# Patient Record
Sex: MALE | Race: BLACK OR AFRICAN AMERICAN | Employment: OTHER | URBAN - METROPOLITAN AREA
[De-identification: names, ages, dates, MRNs, and addresses within clinical notes are randomized per-mention and may not be internally consistent; named-entity substitution may affect disease eponyms.]

---

## 2019-01-03 ENCOUNTER — HOSPITAL ENCOUNTER (OUTPATIENT)
Dept: LAB | Age: 64
Discharge: HOME | End: 2019-01-03
Payer: OTHER GOVERNMENT

## 2019-01-03 LAB — WBC #/AREA STL HPF: NORMAL /HPF (ref 0–4)

## 2019-01-03 PROCEDURE — 87493 C DIFF AMPLIFIED PROBE: CPT

## 2019-01-03 PROCEDURE — 89055 LEUKOCYTE ASSESSMENT FECAL: CPT

## 2019-01-03 PROCEDURE — 87177 OVA AND PARASITES SMEARS: CPT

## 2019-01-03 PROCEDURE — 87075 CULTR BACTERIA EXCEPT BLOOD: CPT

## 2019-01-04 LAB
Lab: NORMAL
SPECIMEN SOURCE: NORMAL

## 2019-01-08 LAB
O+P SPEC MICRO: NORMAL
O+P STL CONC: NORMAL
SPECIMEN SOURCE: NORMAL

## 2019-01-09 LAB
FAX TO INFO,FAXT: NORMAL
FAX TO NUMBER,FAXN: NORMAL
Lab: NORMAL
REFERENCE LAB,REFLB: NORMAL
TEST DESCRIPTION:,ATST: NORMAL

## 2022-04-05 ENCOUNTER — HOSPITAL ENCOUNTER (OUTPATIENT)
Dept: INFUSION THERAPY | Age: 67
Discharge: HOME | End: 2022-04-05
Payer: MEDICARE

## 2022-04-05 VITALS
DIASTOLIC BLOOD PRESSURE: 74 MMHG | RESPIRATION RATE: 16 BRPM | OXYGEN SATURATION: 98 % | SYSTOLIC BLOOD PRESSURE: 123 MMHG | HEART RATE: 78 BPM | TEMPERATURE: 98.6 F

## 2022-04-05 LAB
BASO+EOS+MONOS # BLD AUTO: 0.3 K/UL (ref 0–2.3)
BASO+EOS+MONOS NFR BLD AUTO: 4 % (ref 0.1–17)
DIFFERENTIAL METHOD BLD: ABNORMAL
ERYTHROCYTE [DISTWIDTH] IN BLOOD BY AUTOMATED COUNT: 14.4 % (ref 11.5–14.5)
HCT VFR BLD AUTO: 50.4 % (ref 36–48)
HGB BLD-MCNC: 16.5 G/DL (ref 12–16)
LYMPHOCYTES # BLD: 2.5 K/UL (ref 1.1–5.9)
LYMPHOCYTES NFR BLD: 33 % (ref 14–44)
MCH RBC QN AUTO: 30 PG (ref 25–35)
MCHC RBC AUTO-ENTMCNC: 32.7 G/DL (ref 31–37)
MCV RBC AUTO: 91.6 FL (ref 78–102)
NEUTS SEG # BLD: 4.7 K/UL (ref 1.8–9.5)
NEUTS SEG NFR BLD: 62 % (ref 40–70)
PLATELET # BLD AUTO: 166 K/UL (ref 135–420)
PSA SERPL-MCNC: 0.4 NG/ML (ref 0–4)
RBC # BLD AUTO: 5.5 M/UL (ref 4.1–5.1)
WBC # BLD AUTO: 7.5 K/UL (ref 4.5–13)

## 2022-04-05 PROCEDURE — 85049 AUTOMATED PLATELET COUNT: CPT

## 2022-04-05 PROCEDURE — 84153 ASSAY OF PSA TOTAL: CPT

## 2022-04-05 PROCEDURE — 99195 PHLEBOTOMY: CPT

## 2022-04-05 PROCEDURE — 74011000250 HC RX REV CODE- 250: Performed by: NURSE PRACTITIONER

## 2022-04-05 PROCEDURE — 85025 COMPLETE CBC W/AUTO DIFF WBC: CPT

## 2022-04-05 PROCEDURE — 74011250636 HC RX REV CODE- 250/636: Performed by: NURSE PRACTITIONER

## 2022-04-05 PROCEDURE — 84403 ASSAY OF TOTAL TESTOSTERONE: CPT

## 2022-04-05 RX ORDER — GUAIFENESIN 100 MG/5ML
81 LIQUID (ML) ORAL DAILY
COMMUNITY

## 2022-04-05 RX ORDER — CYCLOBENZAPRINE HCL 10 MG
10 TABLET ORAL
COMMUNITY

## 2022-04-05 RX ORDER — SODIUM CHLORIDE 9 MG/ML
500 INJECTION, SOLUTION INTRAVENOUS CONTINUOUS
Status: DISCONTINUED | OUTPATIENT
Start: 2022-04-05 | End: 2022-04-07 | Stop reason: HOSPADM

## 2022-04-05 RX ORDER — ATORVASTATIN CALCIUM 40 MG/1
40 TABLET, FILM COATED ORAL DAILY
COMMUNITY

## 2022-04-05 RX ORDER — SODIUM CHLORIDE 9 MG/ML
10-40 INJECTION INTRAMUSCULAR; INTRAVENOUS; SUBCUTANEOUS AS NEEDED
Status: DISCONTINUED | OUTPATIENT
Start: 2022-04-05 | End: 2022-04-06 | Stop reason: HOSPADM

## 2022-04-05 RX ORDER — AMLODIPINE BESYLATE 10 MG/1
10 TABLET ORAL DAILY
COMMUNITY

## 2022-04-05 RX ORDER — TRIMETHOPRIM 100 MG/1
100 TABLET ORAL 2 TIMES DAILY
COMMUNITY

## 2022-04-05 RX ADMIN — SODIUM CHLORIDE, PRESERVATIVE FREE 10 ML: 5 INJECTION INTRAVENOUS at 11:00

## 2022-04-05 RX ADMIN — SODIUM CHLORIDE 250 ML: 0.9 INJECTION, SOLUTION INTRAVENOUS at 11:50

## 2022-04-05 NOTE — PROGRESS NOTES
[facility] Progress Note    Date: 2022    Name: Slim Murrieta    MRN: [id number]         : 1955      Mr. Beryle Mariscal arrived to [facility] at 0900. Mr. Beryle Mariscal was assessed and education was provided. Mr. Norris Devries vitals were reviewed. Visit Vitals  /74 (BP 1 Location: Right upper arm, BP Patient Position: Sitting)   Pulse 78   Temp 98.6 °F (37 °C)   Resp 16   SpO2 98%       22g IV inserted in patient's Right antecubital  x1 attempt. Positive for blood return/ flushes without difficulty. Recent Results (from the past 12 hour(s))   CBC WITH 3 PART DIFF    Collection Time: 22  9:15 AM   Result Value Ref Range    WBC 7.5 4.5 - 13.0 K/uL    RBC 5.50 (H) 4.10 - 5.10 M/uL    HGB 16.5 (HH) 12.0 - 16.0 g/dL    HCT 50.4 (HH) 36 - 48 %    MCV 91.6 78 - 102 FL    MCH 30.0 25.0 - 35.0 PG    MCHC 32.7 31 - 37 g/dL    RDW 14.4 11.5 - 14.5 %    NEUTROPHILS 62 40 - 70 %    Mixed cells 4 0.1 - 17 %    LYMPHOCYTES 33 14 - 44 %    ABS. NEUTROPHILS 4.7 1.8 - 9.5 K/UL    ABS. MIXED CELLS 0.3 0.0 - 2.3 K/uL    ABS. LYMPHOCYTES 2.5 1.1 - 5.9 K/UL    DF AUTOMATED     PLATELET COUNT    Collection Time: 22  9:44 AM   Result Value Ref Range    PLATELET 471 686 - 674 K/uL     HCT 50.4 expected for patient diagnosis. Patient here for treatment. Therapeutic Phlbotomy started brisk flow as ordered followed by NS flush. Returned to no flow could not get flow to continue restarted 20G IV in Rt hand brisk. Blood clotted several times again. Was able to obtain to draw off about 250ml and replaced 250ml of NS. Patient was done for the day and needed to go. Mr. Beryle Mariscal tolerated well without many complaints. IV removed/ intact. Gauze/ coban to site. Mr. Beryle Mariscal was discharged from [place] [address] in stable condition at 1215. He is to return on 22 at 0900 for his next appointment.     Harrison Oviedo RN  2022

## 2022-04-06 LAB — TESTOST SERPL-MCNC: 81 NG/DL (ref 264–916)

## 2022-04-19 ENCOUNTER — HOSPITAL ENCOUNTER (OUTPATIENT)
Dept: INFUSION THERAPY | Age: 67
Discharge: HOME | End: 2022-04-19
Payer: MEDICARE

## 2022-04-19 VITALS
OXYGEN SATURATION: 97 % | SYSTOLIC BLOOD PRESSURE: 131 MMHG | HEART RATE: 75 BPM | TEMPERATURE: 98.4 F | DIASTOLIC BLOOD PRESSURE: 77 MMHG | RESPIRATION RATE: 16 BRPM

## 2022-04-19 LAB
BASO+EOS+MONOS # BLD AUTO: 0.3 K/UL (ref 0–2.3)
BASO+EOS+MONOS NFR BLD AUTO: 5 % (ref 0.1–17)
DIFFERENTIAL METHOD BLD: ABNORMAL
ERYTHROCYTE [DISTWIDTH] IN BLOOD BY AUTOMATED COUNT: 13.7 % (ref 11.5–14.5)
HCT VFR BLD AUTO: 49.2 % (ref 36–48)
HGB BLD-MCNC: 16.4 G/DL (ref 12–16)
LYMPHOCYTES # BLD: 2.4 K/UL (ref 1.1–5.9)
LYMPHOCYTES NFR BLD: 37 % (ref 14–44)
MCH RBC QN AUTO: 30.4 PG (ref 25–35)
MCHC RBC AUTO-ENTMCNC: 33.3 G/DL (ref 31–37)
MCV RBC AUTO: 91.1 FL (ref 78–102)
NEUTS SEG # BLD: 3.7 K/UL (ref 1.8–9.5)
NEUTS SEG NFR BLD: 58 % (ref 40–70)
PLATELET # BLD AUTO: 142 K/UL (ref 140–440)
RBC # BLD AUTO: 5.4 M/UL (ref 4.1–5.1)
WBC # BLD AUTO: 6.4 K/UL (ref 4.5–13)

## 2022-04-19 PROCEDURE — 36415 COLL VENOUS BLD VENIPUNCTURE: CPT

## 2022-04-19 PROCEDURE — 74011250636 HC RX REV CODE- 250/636: Performed by: NURSE PRACTITIONER

## 2022-04-19 PROCEDURE — 99195 PHLEBOTOMY: CPT

## 2022-04-19 PROCEDURE — 85025 COMPLETE CBC W/AUTO DIFF WBC: CPT

## 2022-04-19 RX ORDER — SODIUM CHLORIDE 9 MG/ML
500 INJECTION, SOLUTION INTRAVENOUS CONTINUOUS
Status: DISCONTINUED | OUTPATIENT
Start: 2022-04-19 | End: 2022-04-21 | Stop reason: HOSPADM

## 2022-04-19 RX ADMIN — SODIUM CHLORIDE 500 ML: 0.9 INJECTION, SOLUTION INTRAVENOUS at 09:55

## 2022-04-19 NOTE — PROGRESS NOTES
[facility] Progress Note    Date: 2022    Name: Bobbi Dowd    MRN: [id number]         : 1955      Mr. Malcom Hewitt arrived to [place] at 80. Mr. Malcom Hewitt was assessed and education was provided. Mr. Mendel Gonzáles vitals were reviewed. Visit Vitals  /77 (BP 1 Location: Left upper arm, BP Patient Position: Sitting)   Pulse 75   Temp 98.4 °F (36.9 °C)   Resp 16   SpO2 97%        CBC collected via Venipuncture left hand coban and gauze to site. Recent Results (from the past 12 hour(s))   CBC WITH 3 PART DIFF    Collection Time: 22  9:15 AM   Result Value Ref Range    WBC 6.4 4.5 - 13.0 K/uL    RBC 5.40 (H) 4.10 - 5.10 M/uL    HGB 16.4 (HH) 12.0 - 16.0 g/dL    HCT 49.2 (H) 36 - 48 %    MCV 91.1 78 - 102 FL    MCH 30.4 25.0 - 35.0 PG    MCHC 33.3 31 - 37 g/dL    RDW 13.7 11.5 - 14.5 %    PLATELET 557 678 - 932 K/uL    NEUTROPHILS 58 40 - 70 %    Mixed cells 5 0.1 - 17 %    LYMPHOCYTES 37 14 - 44 %    ABS. NEUTROPHILS 3.7 1.8 - 9.5 K/UL    ABS. MIXED CELLS 0.3 0.0 - 2.3 K/uL    ABS. LYMPHOCYTES 2.4 1.1 - 5.9 K/UL    DF AUTOMATED       HCT 49.2, HGB 16.4, RBC 5.40 expected for patient diagnosis. Patient here for treatment.     20g IV inserted in patient's Right antecubital  x2 attempts. Positive for blood return/ flushes without difficulty. Therapeutic Phlbotomy started brisk flow as ordered followed by NS flush. 500 ml removed, 500 ml NS replaced. Mr. Malcom Hewitt tolerated well without many complaints. IV removed/ intact. Gauze/ coban to site. Discharge instructions given, verbalized understanding by patient. Mr. Malcom Hewitt was discharged from [place] [address] in stable condition at 1035. He is to return on 5/3/22 at 0900 for his next appointment.     James Peter RN  2022

## 2022-05-03 ENCOUNTER — HOSPITAL ENCOUNTER (OUTPATIENT)
Dept: INFUSION THERAPY | Age: 67
Discharge: HOME | End: 2022-05-03
Payer: MEDICARE

## 2022-05-03 VITALS
SYSTOLIC BLOOD PRESSURE: 135 MMHG | RESPIRATION RATE: 16 BRPM | TEMPERATURE: 98.2 F | DIASTOLIC BLOOD PRESSURE: 77 MMHG | HEART RATE: 83 BPM | OXYGEN SATURATION: 98 %

## 2022-05-03 LAB
BASO+EOS+MONOS # BLD AUTO: 0.4 K/UL (ref 0–2.3)
BASO+EOS+MONOS NFR BLD AUTO: 6 % (ref 0.1–17)
DIFFERENTIAL METHOD BLD: NORMAL
ERYTHROCYTE [DISTWIDTH] IN BLOOD BY AUTOMATED COUNT: 14.1 % (ref 11.5–14.5)
HCT VFR BLD AUTO: 45 % (ref 36–48)
HGB BLD-MCNC: 14.7 G/DL (ref 12–16)
LYMPHOCYTES # BLD: 2.3 K/UL (ref 1.1–5.9)
LYMPHOCYTES NFR BLD: 36 % (ref 14–44)
MCH RBC QN AUTO: 29.9 PG (ref 25–35)
MCHC RBC AUTO-ENTMCNC: 32.7 G/DL (ref 31–37)
MCV RBC AUTO: 91.6 FL (ref 78–102)
NEUTS SEG # BLD: 3.7 K/UL (ref 1.8–9.5)
NEUTS SEG NFR BLD: 58 % (ref 40–70)
PLATELET # BLD AUTO: 164 K/UL (ref 140–440)
RBC # BLD AUTO: 4.91 M/UL (ref 4.1–5.1)
WBC # BLD AUTO: 6.4 K/UL (ref 4.5–13)

## 2022-05-03 PROCEDURE — 36415 COLL VENOUS BLD VENIPUNCTURE: CPT

## 2022-05-03 PROCEDURE — 85025 COMPLETE CBC W/AUTO DIFF WBC: CPT

## 2022-05-03 NOTE — PROGRESS NOTES
[facility] Progress Note    Date: May 3, 2022    Name: Natalie Fontanez    MRN: [id number]         : 1955    Therapeutic Phlebotomy [HELD]    Mr. Carlos Arrieta arrived to [address] [place] [address] at 0900 in [facility]. Mr. Carlos Arrieta was assessed and education was provided. Mr. Sana Matthews vitals were reviewed. Visit Vitals  /77 (BP 1 Location: Left arm, BP Patient Position: Sitting)   Pulse 83   Temp 98.2 °F (36.8 °C)   Resp 16   SpO2 98%       CBC drawn from PIV inserted in LAC #22G, flushes easily with brisk blood return. Recent Results (from the past 12 hour(s))   CBC WITH 3 PART DIFF    Collection Time: 22  9:00 AM   Result Value Ref Range    WBC 6.4 4.5 - 13.0 K/uL    RBC 4.91 4.10 - 5.10 M/uL    HGB 14.7 12.0 - 16.0 g/dL    HCT 45.0 36 - 48 %    MCV 91.6 78 - 102 FL    MCH 29.9 25.0 - 35.0 PG    MCHC 32.7 31 - 37 g/dL    RDW 14.1 11.5 - 14.5 %    PLATELET 177 538 - 642 K/uL    NEUTROPHILS 58 40 - 70 %    Mixed cells 6 0.1 - 17 %    LYMPHOCYTES 36 14 - 44 %    ABS. NEUTROPHILS 3.7 1.8 - 9.5 K/UL    ABS. MIXED CELLS 0.4 0.0 - 2.3 K/uL    ABS. LYMPHOCYTES 2.3 1.1 - 5.9 K/UL    DF AUTOMATED       HCT 45.0, ( expected for patient diagnosis). Therapeutic Phlbotomy HELD per order of hold for hgb less than 48. Mr. Carlos Arrieta tolerated well without many complaints. IV removed/ intact. Gauze/ coban to site. Discharge instructions given, verbalized understanding by patient. Mr. Carlos Arrieta was discharged from [place] [address] in stable condition at 9094. He is to return on 6/3/22 at 0900 for his next appointment.     Barb Barillas RN  May 3, 2022

## 2022-06-03 ENCOUNTER — APPOINTMENT (OUTPATIENT)
Dept: INFUSION THERAPY | Age: 67
End: 2022-06-03

## 2022-06-13 ENCOUNTER — HOSPITAL ENCOUNTER (OUTPATIENT)
Dept: INFUSION THERAPY | Age: 67
Discharge: HOME | End: 2022-06-13
Payer: MEDICARE

## 2022-06-13 VITALS
SYSTOLIC BLOOD PRESSURE: 119 MMHG | HEART RATE: 67 BPM | DIASTOLIC BLOOD PRESSURE: 70 MMHG | RESPIRATION RATE: 16 BRPM | OXYGEN SATURATION: 99 % | TEMPERATURE: 98.3 F

## 2022-06-13 LAB
BASO+EOS+MONOS # BLD AUTO: 0.3 K/UL (ref 0–2.3)
BASO+EOS+MONOS NFR BLD AUTO: 6 % (ref 0.1–17)
DIFFERENTIAL METHOD BLD: NORMAL
ERYTHROCYTE [DISTWIDTH] IN BLOOD BY AUTOMATED COUNT: 14.2 % (ref 11.5–14.5)
HCT VFR BLD AUTO: 47.1 % (ref 36–48)
HGB BLD-MCNC: 15.5 G/DL (ref 12–16)
LYMPHOCYTES # BLD: 2.2 K/UL (ref 1.1–5.9)
LYMPHOCYTES NFR BLD: 41 % (ref 14–44)
MCH RBC QN AUTO: 30.5 PG (ref 25–35)
MCHC RBC AUTO-ENTMCNC: 32.9 G/DL (ref 31–37)
MCV RBC AUTO: 92.5 FL (ref 78–102)
NEUTS SEG # BLD: 2.8 K/UL (ref 1.8–9.5)
NEUTS SEG NFR BLD: 52 % (ref 40–70)
RBC # BLD AUTO: 5.09 M/UL (ref 4.1–5.1)
WBC # BLD AUTO: 5.3 K/UL (ref 4.5–13)

## 2022-06-13 PROCEDURE — 85025 COMPLETE CBC W/AUTO DIFF WBC: CPT

## 2022-06-13 PROCEDURE — 36415 COLL VENOUS BLD VENIPUNCTURE: CPT

## 2022-06-13 NOTE — PROGRESS NOTES
[facility] Progress Note    Date: 2022    Name: Hung Braxton    MRN: [id number]         : 1955    THERAPEUTIC PHLEBOTOMY [HELD]    Mr. Aspen Ellsworth was assessed and education was provided. No complaints today. Mr. Benton Salgado vitals were reviewed and patient was observed for 5 minutes prior to treatment. Visit Vitals  /70 (BP 1 Location: Left arm, BP Patient Position: Sitting)   Pulse 67   Temp 98.3 °F (36.8 °C)   Resp 16   SpO2 99%       Lab results were obtained and reviewed. Recent Results (from the past 12 hour(s))   CBC WITH 3 PART DIFF    Collection Time: 22  9:07 AM   Result Value Ref Range    WBC 5.3 4.5 - 13.0 K/uL    RBC 5.09 4. 10 - 5.10 M/uL    HGB 15.5 12.0 - 16.0 g/dL    HCT 47.1 36 - 48 %    MCV 92.5 78 - 102 FL    MCH 30.5 25.0 - 35.0 PG    MCHC 32.9 31 - 37 g/dL    RDW 14.2 11.5 - 14.5 %    NEUTROPHILS 52 40 - 70 %    Mixed cells 6 0.1 - 17 %    LYMPHOCYTES 41 14 - 44 %    ABS. NEUTROPHILS 2.8 1.8 - 9.5 K/UL    ABS. MIXED CELLS 0.3 0.0 - 2.3 K/uL    ABS. LYMPHOCYTES 2.2 1.1 - 5.9 K/UL    DF AUTOMATED         Blood drawn from LAC, gauze and coban applied. HCT 47.1; HGB 15.5; expected for diagnosis. Treatment held per order. Mr. Aspen Ellsworth tolerated the infusion, and had no complaints. Patient armband removed and shredded. Mr. Aspen Ellsworth was discharged from [place] [address] in stable condition at [place] [address]. He is to return on 2022 at 0900 for his next appointment.     Janelle Nayak RN  2022  9:26 AM

## 2022-07-11 ENCOUNTER — HOSPITAL ENCOUNTER (OUTPATIENT)
Dept: INFUSION THERAPY | Age: 67
Discharge: HOME | End: 2022-07-11
Payer: MEDICARE

## 2022-07-11 VITALS
DIASTOLIC BLOOD PRESSURE: 76 MMHG | OXYGEN SATURATION: 100 % | TEMPERATURE: 97.9 F | RESPIRATION RATE: 16 BRPM | SYSTOLIC BLOOD PRESSURE: 134 MMHG | HEART RATE: 87 BPM

## 2022-07-11 LAB
BASO+EOS+MONOS # BLD AUTO: 0.4 K/UL (ref 0–2.3)
BASO+EOS+MONOS NFR BLD AUTO: 7 % (ref 0.1–17)
DIFFERENTIAL METHOD BLD: ABNORMAL
ERYTHROCYTE [DISTWIDTH] IN BLOOD BY AUTOMATED COUNT: 13.7 % (ref 11.5–14.5)
HCT VFR BLD AUTO: 47.4 % (ref 36–48)
HGB BLD-MCNC: 15.7 G/DL (ref 12–16)
LYMPHOCYTES # BLD: 2.4 K/UL (ref 1.1–5.9)
LYMPHOCYTES NFR BLD: 41 % (ref 14–44)
MCH RBC QN AUTO: 30.4 PG (ref 25–35)
MCHC RBC AUTO-ENTMCNC: 33.1 G/DL (ref 31–37)
MCV RBC AUTO: 91.9 FL (ref 78–102)
NEUTS SEG # BLD: 3.1 K/UL (ref 1.8–9.5)
NEUTS SEG NFR BLD: 52 % (ref 40–70)
PLATELET # BLD AUTO: 196 K/UL (ref 135–420)
RBC # BLD AUTO: 5.16 M/UL (ref 4.1–5.1)
WBC # BLD AUTO: 5.9 K/UL (ref 4.5–13)

## 2022-07-11 PROCEDURE — 85025 COMPLETE CBC W/AUTO DIFF WBC: CPT

## 2022-07-11 PROCEDURE — 36415 COLL VENOUS BLD VENIPUNCTURE: CPT

## 2022-07-11 PROCEDURE — 85049 AUTOMATED PLATELET COUNT: CPT

## 2022-07-11 NOTE — PROGRESS NOTES
[facility] Progress Note    Date: 2022    Name: Sylvester Babinski    MRN: [id number]         : 1955    THERAPEUTIC PHLEBOTOMY [HELD]    Mr. Steffanie Kuhn was assessed and education was provided. No complaints today. Mr. Ann Berman vitals were reviewed and patient was observed for 5 minutes prior to treatment. Visit Vitals  /76 (BP 1 Location: Left upper arm, BP Patient Position: Sitting)   Pulse 87   Temp 97.9 °F (36.6 °C)   Resp 16   SpO2 100%       Lab results were obtained and reviewed. Recent Results (from the past 12 hour(s))   CBC WITH 3 PART DIFF    Collection Time: 22  9:15 AM   Result Value Ref Range    WBC 5.9 4.5 - 13.0 K/uL    RBC 5.16 (H) 4.10 - 5.10 M/uL    HGB 15.7 12.0 - 16.0 g/dL    HCT 47.4 36 - 48 %    MCV 91.9 78 - 102 FL    MCH 30.4 25.0 - 35.0 PG    MCHC 33.1 31 - 37 g/dL    RDW 13.7 11.5 - 14.5 %    NEUTROPHILS 52 40 - 70 %    Mixed cells 7 0.1 - 17 %    LYMPHOCYTES 41 14 - 44 %    ABS. NEUTROPHILS 3.1 1.8 - 9.5 K/UL    ABS. MIXED CELLS 0.4 0.0 - 2.3 K/uL    ABS. LYMPHOCYTES 2.4 1.1 - 5.9 K/UL    DF AUTOMATED     PLATELET COUNT    Collection Time: 22  9:15 AM   Result Value Ref Range    PLATELET 903 223 - 263 K/uL       Blood drawn from LAC, gauze and coban applied. HCT 47.4; HGB 15.7; expected for diagnosis. Treatment held per order. Mr. tSeffanie Kuhn tolerated the infusion, and had no complaints. Patient armband removed and shredded. Mr. Steffanie Kuhn was discharged from [place] [address] in stable condition at 0211. He is to return on 2022 at 1000 for his next appointment.     Nehemiah Lewis RN  2022  9:26 AM

## 2022-08-08 ENCOUNTER — HOSPITAL ENCOUNTER (OUTPATIENT)
Dept: INFUSION THERAPY | Age: 67
Discharge: HOME | End: 2022-08-08
Payer: MEDICARE

## 2022-08-08 VITALS
DIASTOLIC BLOOD PRESSURE: 77 MMHG | TEMPERATURE: 98.6 F | SYSTOLIC BLOOD PRESSURE: 117 MMHG | OXYGEN SATURATION: 99 % | RESPIRATION RATE: 16 BRPM | HEART RATE: 92 BPM

## 2022-08-08 LAB
BASO+EOS+MONOS # BLD AUTO: 0.5 K/UL (ref 0–2.3)
BASO+EOS+MONOS NFR BLD AUTO: 7 % (ref 0.1–17)
DIFFERENTIAL METHOD BLD: ABNORMAL
ERYTHROCYTE [DISTWIDTH] IN BLOOD BY AUTOMATED COUNT: 14.4 % (ref 11.5–14.5)
HCT VFR BLD AUTO: 48.7 % (ref 36–48)
HGB BLD-MCNC: 15.6 G/DL (ref 12–16)
LYMPHOCYTES # BLD: 2.2 K/UL (ref 1.1–5.9)
LYMPHOCYTES NFR BLD: 32 % (ref 14–44)
MCH RBC QN AUTO: 29.9 PG (ref 25–35)
MCHC RBC AUTO-ENTMCNC: 32 G/DL (ref 31–37)
MCV RBC AUTO: 93.5 FL (ref 78–102)
NEUTS SEG # BLD: 4 K/UL (ref 1.8–9.5)
NEUTS SEG NFR BLD: 61 % (ref 40–70)
PLATELET # BLD AUTO: 134 K/UL (ref 140–440)
RBC # BLD AUTO: 5.21 M/UL (ref 4.1–5.1)
WBC # BLD AUTO: 6.7 K/UL (ref 4.5–13)

## 2022-08-08 PROCEDURE — 85025 COMPLETE CBC W/AUTO DIFF WBC: CPT

## 2022-08-08 PROCEDURE — 36415 COLL VENOUS BLD VENIPUNCTURE: CPT

## 2022-08-08 NOTE — PROGRESS NOTES
[facility] Progress Note    Date: 2022    Name: Aris Barrientos    MRN: [id number]         : 1955    Therapeutic Phlebotomy [HELD]    Mr. Abhinav Blank arrived to [facility] at 1000 in NAD. Mr. Abhinav Blank was assessed and education was provided. Mr. Bridgett Mohan vitals were reviewed. Visit Vitals  /77 (BP 1 Location: Left arm, BP Patient Position: Sitting)   Pulse 92   Temp 98.6 °F (37 °C)   Resp 16   SpO2 99%       CBC drawn LHAND. Recent Results (from the past 12 hour(s))   CBC WITH 3 PART DIFF    Collection Time: 22 10:15 AM   Result Value Ref Range    WBC 6.7 4.5 - 13.0 K/uL    RBC 5.21 (H) 4.10 - 5.10 M/uL    HGB 15.6 12.0 - 16.0 g/dL    HCT 48.7 (H) 36 - 48 %    MCV 93.5 78 - 102 FL    MCH 29.9 25.0 - 35.0 PG    MCHC 32.0 31 - 37 g/dL    RDW 14.4 11.5 - 14.5 %    PLATELET 892 (L) 901 - 440 K/uL    NEUTROPHILS 61 40 - 70 %    Mixed cells 7 0.1 - 17 %    LYMPHOCYTES 32 14 - 44 %    ABS. NEUTROPHILS 4.0 1.8 - 9.5 K/UL    ABS. MIXED CELLS 0.5 0.0 - 2.3 K/uL    ABS. LYMPHOCYTES 2.2 1.1 - 5.9 K/UL    DF AUTOMATED       HCT 48.7, ( expected for patient diagnosis). Therapeutic Phlbotomy HELD per order of hold for hgb less than 50. Mr. Abhinav Blank tolerated well without many complaints. IV removed/ intact. Gauze/ coban to site. Discharge instructions given, verbalized understanding by patient. Mr. Abhinav Blank was discharged from [place] [address] in stable condition at 100. He is to return on 22 at 0900 for his next appointment.     Meg Zavala RN  2022

## 2022-09-06 ENCOUNTER — HOSPITAL ENCOUNTER (OUTPATIENT)
Dept: INFUSION THERAPY | Age: 67
End: 2022-09-06

## 2022-09-14 ENCOUNTER — APPOINTMENT (OUTPATIENT)
Dept: INFUSION THERAPY | Age: 67
End: 2022-09-14

## 2022-09-28 ENCOUNTER — HOSPITAL ENCOUNTER (OUTPATIENT)
Dept: INFUSION THERAPY | Age: 67
Discharge: HOME | End: 2022-09-28
Payer: MEDICARE

## 2022-09-28 VITALS — TEMPERATURE: 98.2 F | HEART RATE: 88 BPM | OXYGEN SATURATION: 98 % | RESPIRATION RATE: 18 BRPM

## 2022-09-28 LAB
BASO+EOS+MONOS # BLD AUTO: 0.4 K/UL (ref 0–2.3)
BASO+EOS+MONOS NFR BLD AUTO: 7 % (ref 0.1–17)
DIFFERENTIAL METHOD BLD: ABNORMAL
ERYTHROCYTE [DISTWIDTH] IN BLOOD BY AUTOMATED COUNT: 14.6 % (ref 11.5–14.5)
HCT VFR BLD AUTO: 48.1 % (ref 36–48)
HGB BLD-MCNC: 15.5 G/DL (ref 12–16)
LYMPHOCYTES # BLD: 2.7 K/UL (ref 1.1–5.9)
LYMPHOCYTES NFR BLD: 45 % (ref 14–44)
MCH RBC QN AUTO: 29.5 PG (ref 25–35)
MCHC RBC AUTO-ENTMCNC: 32.2 G/DL (ref 31–37)
MCV RBC AUTO: 91.6 FL (ref 78–102)
NEUTS SEG # BLD: 2.9 K/UL (ref 1.8–9.5)
NEUTS SEG NFR BLD: 48 % (ref 40–70)
PLATELET # BLD AUTO: 171 K/UL (ref 140–440)
RBC # BLD AUTO: 5.25 M/UL (ref 4.1–5.1)
WBC # BLD AUTO: 6 K/UL (ref 4.5–13)

## 2022-09-28 PROCEDURE — 85025 COMPLETE CBC W/AUTO DIFF WBC: CPT

## 2022-09-28 PROCEDURE — 36415 COLL VENOUS BLD VENIPUNCTURE: CPT

## 2022-09-28 NOTE — PROGRESS NOTES
[facility] Progress Note    Date: 2022    Name: Phil Hodge    MRN: [id number]         : 1955    Therapeutic Phlebotomy [HELD]    Mr. Juana Lou arrived to [facility] at 1300 in NAD. \" Feels good not sure if going to need treatment today\"      Mr. Juana Lou was assessed and education was provided. Mr. Hedy Lopez vitals were reviewed. Visit Vitals  Pulse 88   Temp 98.2 °F (36.8 °C)   Resp 18   SpO2 98%       CBC drawn using a 23 G butterfly needle on 1st attempt in left antecubical, covered with 2x2 and Coban. Pt tolerated without complaint. Recent Results (from the past 12 hour(s))   CBC WITH 3 PART DIFF    Collection Time: 22  1:15 PM   Result Value Ref Range    WBC 6.0 4.5 - 13.0 K/uL    RBC 5.25 (H) 4.10 - 5.10 M/uL    HGB 15.5 12.0 - 16.0 g/dL    HCT 48.1 (H) 36 - 48 %    MCV 91.6 78 - 102 FL    MCH 29.5 25.0 - 35.0 PG    MCHC 32.2 31 - 37 g/dL    RDW 14.6 (H) 11.5 - 14.5 %    PLATELET 240 674 - 615 K/uL    NEUTROPHILS 48 40 - 70 %    Mixed cells 7 0.1 - 17 %    LYMPHOCYTES 45 (H) 14 - 44 %    ABS. NEUTROPHILS 2.9 1.8 - 9.5 K/UL    ABS. MIXED CELLS 0.4 0.0 - 2.3 K/uL    ABS. LYMPHOCYTES 2.7 1.1 - 5.9 K/UL    DF AUTOMATED       HCT 48.1, ( expected for patient diagnosis). Therapeutic Phlbotomy HELD per order of hold for hgb less than 50. Mr. Juana Lou tolerated well without many complaints. Mr. Juana Lou was discharged from [place] [address] in stable condition at 1320.   He is to return on 2022 at 1300 for his next appointment for labs to determine if he needs therapeutic phlebotomy     Nayana Samuels RN  2022

## 2022-11-08 ENCOUNTER — TRANSCRIBE ORDER (OUTPATIENT)
Dept: SCHEDULING | Age: 67
End: 2022-11-08

## 2022-11-08 DIAGNOSIS — M25.562 LEFT KNEE PAIN: Primary | ICD-10-CM

## 2022-12-28 ENCOUNTER — APPOINTMENT (OUTPATIENT)
Dept: INFUSION THERAPY | Age: 67
End: 2022-12-28
Payer: MEDICARE

## 2023-01-04 ENCOUNTER — HOSPITAL ENCOUNTER (OUTPATIENT)
Dept: INFUSION THERAPY | Age: 68
Discharge: HOME | End: 2023-01-04
Payer: MEDICARE

## 2023-01-04 VITALS
HEART RATE: 81 BPM | SYSTOLIC BLOOD PRESSURE: 141 MMHG | DIASTOLIC BLOOD PRESSURE: 77 MMHG | OXYGEN SATURATION: 99 % | TEMPERATURE: 98 F | RESPIRATION RATE: 18 BRPM

## 2023-01-04 LAB
BASO+EOS+MONOS # BLD AUTO: 0.4 K/UL (ref 0–2.3)
BASO+EOS+MONOS NFR BLD AUTO: 6 % (ref 0.1–17)
DIFFERENTIAL METHOD BLD: ABNORMAL
ERYTHROCYTE [DISTWIDTH] IN BLOOD BY AUTOMATED COUNT: 14 % (ref 11.5–14.5)
HCT VFR BLD AUTO: 46.6 % (ref 36–48)
HGB BLD-MCNC: 15.4 G/DL (ref 12–16)
LYMPHOCYTES # BLD: 2.4 K/UL (ref 1.1–5.9)
LYMPHOCYTES NFR BLD: 42 % (ref 14–44)
MCH RBC QN AUTO: 29.5 PG (ref 25–35)
MCHC RBC AUTO-ENTMCNC: 33 G/DL (ref 31–37)
MCV RBC AUTO: 89.3 FL (ref 78–102)
NEUTS SEG # BLD: 2.9 K/UL (ref 1.8–9.5)
NEUTS SEG NFR BLD: 52 % (ref 40–70)
PLATELET # BLD AUTO: 197 K/UL (ref 140–440)
RBC # BLD AUTO: 5.22 M/UL (ref 4.1–5.1)
WBC # BLD AUTO: 5.7 K/UL (ref 4.5–13)

## 2023-01-04 PROCEDURE — 36415 COLL VENOUS BLD VENIPUNCTURE: CPT

## 2023-01-04 PROCEDURE — 85025 COMPLETE CBC W/AUTO DIFF WBC: CPT

## 2023-01-04 NOTE — PROGRESS NOTES
[facility] Progress Note    Date: 2023    Name: Chantell Bishop    MRN: [id number]         : 1955    Therapeutic Phlebotomy [HELD]    Mr. Tiffany Ornelas arrived to [facility] at 80 denies any questions or concerns pertaining to treatment today. Mr. Tiffany Ornelas was assessed and education was provided. Mr. Natanael Barraza vitals were reviewed. Visit Vitals  BP (!) 141/77 (BP 1 Location: Left arm, BP Patient Position: Sitting)   Pulse 81   Temp 98 °F (36.7 °C)   Resp 18   SpO2 99%       CBC drawn using a 23 G butterfly needle on 1st attempt in left antecubical, covered with 2x2 and Coban. Pt tolerated without complaint. Recent Results (from the past 12 hour(s))   CBC WITH 3 PART DIFF    Collection Time: 23 11:15 AM   Result Value Ref Range    WBC 5.7 4.5 - 13.0 K/uL    RBC 5.22 (H) 4.10 - 5.10 M/uL    HGB 15.4 12.0 - 16.0 g/dL    HCT 46.6 36 - 48 %    MCV 89.3 78 - 102 FL    MCH 29.5 25.0 - 35.0 PG    MCHC 33.0 31 - 37 g/dL    RDW 14.0 11.5 - 14.5 %    PLATELET 665 857 - 361 K/uL    NEUTROPHILS 52 40 - 70 %    Mixed cells 6 0.1 - 17 %    LYMPHOCYTES 42 14 - 44 %    ABS. NEUTROPHILS 2.9 1.8 - 9.5 K/UL    ABS. MIXED CELLS 0.4 0.0 - 2.3 K/uL    ABS. LYMPHOCYTES 2.4 1.1 - 5.9 K/UL    DF AUTOMATED       HCT 46.6, ( expected for patient diagnosis). Therapeutic Phlbotomy HELD per order of hold for hgb less than 50. Mr. Tiffany Ornelas tolerated well without many complaints.       Mr. Tiffany Ornelas was discharged from [facility] in stable condition at 1125  He is to return on 2023 at 56 for his next appointment for labs to determine if he needs therapeutic phlebotomy     Sally Carrizales RN  2023

## 2023-02-01 ENCOUNTER — HOSPITAL ENCOUNTER (OUTPATIENT)
Dept: INFUSION THERAPY | Age: 68
Discharge: HOME | End: 2023-02-01
Payer: MEDICARE

## 2023-02-01 VITALS
RESPIRATION RATE: 18 BRPM | HEART RATE: 81 BPM | SYSTOLIC BLOOD PRESSURE: 143 MMHG | TEMPERATURE: 98.2 F | DIASTOLIC BLOOD PRESSURE: 82 MMHG

## 2023-02-01 LAB
BASO+EOS+MONOS # BLD AUTO: 0.2 K/UL (ref 0–2.3)
BASO+EOS+MONOS NFR BLD AUTO: 6 % (ref 0.1–17)
DIFFERENTIAL METHOD BLD: ABNORMAL
ERYTHROCYTE [DISTWIDTH] IN BLOOD BY AUTOMATED COUNT: 14 % (ref 11.5–14.5)
HCT VFR BLD AUTO: 45.5 % (ref 36–48)
HGB BLD-MCNC: 8.3 G/DL (ref 12–16)
LYMPHOCYTES # BLD: 1.6 K/UL (ref 1.1–5.9)
LYMPHOCYTES NFR BLD: 45 % (ref 14–44)
MCH RBC QN AUTO: 16.3 PG (ref 25–35)
MCHC RBC AUTO-ENTMCNC: 18.2 G/DL (ref 31–37)
MCV RBC AUTO: 89.2 FL (ref 78–102)
NEUTS SEG # BLD: 1.7 K/UL (ref 1.8–9.5)
NEUTS SEG NFR BLD: 49 % (ref 40–70)
PLATELET # BLD AUTO: 164 K/UL (ref 140–440)
RBC # BLD AUTO: 5.1 M/UL (ref 4.1–5.1)
WBC # BLD AUTO: 3.5 K/UL (ref 4.5–13)

## 2023-02-01 PROCEDURE — 85025 COMPLETE CBC W/AUTO DIFF WBC: CPT

## 2023-02-01 PROCEDURE — 36415 COLL VENOUS BLD VENIPUNCTURE: CPT

## 2023-02-01 NOTE — PROGRESS NOTES
[facility] Progress Note    Date: 2023    Name: Maurice Beach    MRN: [id number]         : 1955    Therapeutic Phlebotomy [HELD]    Mr. Kati De León arrived to [facility] at 56 denies any questions or concerns pertaining to treatment today. Mr. Kati De León was assessed and education was provided. Mr. Amor Beat vitals were reviewed. Visit Vitals  BP (!) 143/82 (BP 1 Location: Left arm, BP Patient Position: Sitting)   Pulse 81   Temp 98.2 °F (36.8 °C)   Resp 18       CBC drawn using a 23 G butterfly needle on 1st attempt in left antecubical, covered with 2x2 and Coban. Pt tolerated without complaint. Recent Results (from the past 12 hour(s))   CBC WITH 3 PART DIFF    Collection Time: 23  1:15 PM   Result Value Ref Range    WBC 3.5 (L) 4.5 - 13.0 K/uL    RBC 5.10 4. 10 - 5.10 M/uL    HGB 8.3 (L) 12.0 - 16.0 g/dL    HCT 45.5 36 - 48 %    MCV 89.2 78 - 102 FL    MCH 16.3 (L) 25.0 - 35.0 PG    MCHC 18.2 (L) 31 - 37 g/dL    RDW 14.0 11.5 - 14.5 %    PLATELET 376 183 - 672 K/uL    NEUTROPHILS 49 40 - 70 %    Mixed cells 6 0.1 - 17 %    LYMPHOCYTES 45 (H) 14 - 44 %    ABS. NEUTROPHILS 1.7 (L) 1.8 - 9.5 K/UL    ABS. MIXED CELLS 0.2 0.0 - 2.3 K/uL    ABS. LYMPHOCYTES 1.6 1.1 - 5.9 K/UL    DF AUTOMATED       HCT 45.5 ( expected for patient diagnosis). Therapeutic Phlbotomy HELD per order of hold for hgb less than 50. Mr. Kati De León tolerated well without many complaints.       Mr. Kati De León was discharged from [place] [address] in stable condition at 1325  He is to return on 3/1/2023 at 1300 for his next appointment for labs to determine if he needs therapeutic phlebotomy     Nuria Moura RN  2023

## 2023-02-12 ENCOUNTER — TRANSCRIBE ORDERS (OUTPATIENT)
Facility: HOSPITAL | Age: 68
End: 2023-02-12

## 2023-02-12 DIAGNOSIS — M25.562 LEFT KNEE PAIN, UNSPECIFIED CHRONICITY: Primary | ICD-10-CM

## 2023-03-01 ENCOUNTER — HOSPITAL ENCOUNTER (OUTPATIENT)
Facility: HOSPITAL | Age: 68
Setting detail: INFUSION SERIES
End: 2023-03-01
Payer: MEDICARE

## 2023-03-01 LAB
BASO+EOS+MONOS # BLD AUTO: 0.6 K/UL (ref 0–2.3)
BASO+EOS+MONOS NFR BLD AUTO: 10 % (ref 0.1–17)
DIFFERENTIAL METHOD BLD: NORMAL
ERYTHROCYTE [DISTWIDTH] IN BLOOD BY AUTOMATED COUNT: 14.4 % (ref 11.5–14.5)
HCT VFR BLD AUTO: 45.3 % (ref 36–48)
HGB BLD-MCNC: 14.8 G/DL (ref 12–16)
LYMPHOCYTES # BLD: 2.2 K/UL (ref 1.1–5.9)
LYMPHOCYTES NFR BLD: 40 % (ref 14–44)
MCH RBC QN AUTO: 29.2 PG (ref 25–35)
MCHC RBC AUTO-ENTMCNC: 32.7 G/DL (ref 31–37)
MCV RBC AUTO: 89.3 FL (ref 78–102)
NEUTS SEG # BLD: 2.7 K/UL (ref 1.8–9.5)
NEUTS SEG NFR BLD: 49 % (ref 40–70)
PLATELET # BLD AUTO: 167 K/UL (ref 140–440)
RBC # BLD AUTO: 5.07 M/UL (ref 4.1–5.1)
WBC # BLD AUTO: 5.5 K/UL (ref 4.5–13)

## 2023-03-01 PROCEDURE — 36415 COLL VENOUS BLD VENIPUNCTURE: CPT

## 2023-03-01 PROCEDURE — 85025 COMPLETE CBC W/AUTO DIFF WBC: CPT

## 2023-03-02 VITALS — SYSTOLIC BLOOD PRESSURE: 140 MMHG | HEART RATE: 73 BPM | TEMPERATURE: 97.6 F | DIASTOLIC BLOOD PRESSURE: 78 MMHG

## 2023-03-02 NOTE — PROGRESS NOTES
[facility] Progress Note    Date: 2023    Name: Teressa Worley    MRN: [id number]     : 1955    Therapeutic Phlebotomy [HELD]    Mr. Richard Sunshine arrived to [facility] at 56 denies any questions or concerns pertaining to treatment today. Mr. Richard Sunshine was assessed and education was provided. Mr. Sidney Horton vitals were reviewed. Visit Vitals  BP (!) 143/82 (BP 1 Location: Left arm, BP Patient Position: Sitting)   Pulse 81   Temp 98.2 °F (36.8 °C)   Resp 18       CBC drawn using a 23 G butterfly needle on 1st attempt in left antecubical, covered with 2x2 and Coban. Pt tolerated without complaint. HCT 45.3 ( expected for patient diagnosis). Therapeutic Phlbotomy HELD per order of hold for hgb less than 50. Mr. Richard Sunshine tolerated well without many complaints.       Mr. Richard Sunshine was discharged from [place] [address] in stable condition at 1325  He is to return on 3/29/2023 at 1300 for his next appointment for labs to determine if he needs therapeutic phlebotomy     Marielena Felix RN  2023

## 2023-03-13 ENCOUNTER — HOSPITAL ENCOUNTER (OUTPATIENT)
Facility: HOSPITAL | Age: 68
Discharge: HOME | End: 2023-03-16
Payer: MEDICARE

## 2023-03-13 DIAGNOSIS — M25.562 LEFT KNEE PAIN, UNSPECIFIED CHRONICITY: ICD-10-CM

## 2023-03-13 PROCEDURE — 73721 MRI JNT OF LWR EXTRE W/O DYE: CPT

## 2024-04-04 ENCOUNTER — HOSPITAL ENCOUNTER (OUTPATIENT)
Facility: HOSPITAL | Age: 69
Discharge: HOME | End: 2024-04-04
Payer: MEDICARE

## 2024-04-04 DIAGNOSIS — Z01.818 PRE-OP TESTING: Primary | ICD-10-CM

## 2024-04-04 LAB
ANION GAP SERPL CALC-SCNC: 6 MMOL/L (ref 3–18)
APPEARANCE UR: CLEAR
BACTERIA URNS QL MICRO: ABNORMAL /HPF
BILIRUB UR QL: NEGATIVE
BUN SERPL-MCNC: 10 MG/DL (ref 7–18)
BUN/CREAT SERPL: 10 (ref 12–20)
CALCIUM SERPL-MCNC: 9.1 MG/DL (ref 8.5–10.1)
CHLORIDE SERPL-SCNC: 107 MMOL/L (ref 100–111)
CO2 SERPL-SCNC: 27 MMOL/L (ref 21–32)
COLOR UR: YELLOW
CREAT SERPL-MCNC: 1.03 MG/DL (ref 0.6–1.3)
EKG ATRIAL RATE: 65 BPM
EKG DIAGNOSIS: NORMAL
EKG P AXIS: 72 DEGREES
EKG P-R INTERVAL: 170 MS
EKG Q-T INTERVAL: 400 MS
EKG QRS DURATION: 82 MS
EKG QTC CALCULATION (BAZETT): 416 MS
EKG R AXIS: 49 DEGREES
EKG T AXIS: 73 DEGREES
EKG VENTRICULAR RATE: 65 BPM
EPITH CASTS URNS QL MICRO: ABNORMAL /LPF (ref 0–5)
ERYTHROCYTE [DISTWIDTH] IN BLOOD BY AUTOMATED COUNT: 14.5 % (ref 11.6–14.5)
GLUCOSE SERPL-MCNC: 123 MG/DL (ref 74–99)
GLUCOSE UR STRIP.AUTO-MCNC: NEGATIVE MG/DL
HCT VFR BLD AUTO: 45.7 % (ref 36–48)
HGB BLD-MCNC: 14.9 G/DL (ref 13–16)
HGB UR QL STRIP: NEGATIVE
KETONES UR QL STRIP.AUTO: NEGATIVE MG/DL
LEUKOCYTE ESTERASE UR QL STRIP.AUTO: ABNORMAL
MCH RBC QN AUTO: 30 PG (ref 24–34)
MCHC RBC AUTO-ENTMCNC: 32.6 G/DL (ref 31–37)
MCV RBC AUTO: 92.1 FL (ref 78–100)
NITRITE UR QL STRIP.AUTO: NEGATIVE
NRBC # BLD: 0 K/UL (ref 0–0.01)
NRBC BLD-RTO: 0 PER 100 WBC
PH UR STRIP: 6.5 (ref 5–8)
PLATELET # BLD AUTO: 193 K/UL (ref 135–420)
PMV BLD AUTO: 11.4 FL (ref 9.2–11.8)
POTASSIUM SERPL-SCNC: 4.3 MMOL/L (ref 3.5–5.5)
PROT UR STRIP-MCNC: NEGATIVE MG/DL
RBC # BLD AUTO: 4.96 M/UL (ref 4.35–5.65)
RBC #/AREA URNS HPF: ABNORMAL /HPF (ref 0–5)
SODIUM SERPL-SCNC: 140 MMOL/L (ref 136–145)
SP GR UR REFRACTOMETRY: 1.01 (ref 1–1.03)
UROBILINOGEN UR QL STRIP.AUTO: 1 EU/DL (ref 0.2–1)
WBC # BLD AUTO: 6.8 K/UL (ref 4.6–13.2)
WBC URNS QL MICRO: ABNORMAL /HPF (ref 0–5)

## 2024-04-04 PROCEDURE — 85027 COMPLETE CBC AUTOMATED: CPT

## 2024-04-04 PROCEDURE — 93005 ELECTROCARDIOGRAM TRACING: CPT | Performed by: UROLOGY

## 2024-04-04 PROCEDURE — 87086 URINE CULTURE/COLONY COUNT: CPT

## 2024-04-04 PROCEDURE — 80048 BASIC METABOLIC PNL TOTAL CA: CPT

## 2024-04-04 PROCEDURE — 81001 URINALYSIS AUTO W/SCOPE: CPT

## 2024-04-05 LAB
BACTERIA SPEC CULT: NORMAL
SERVICE CMNT-IMP: NORMAL

## 2024-04-17 ENCOUNTER — ANESTHESIA (OUTPATIENT)
Facility: HOSPITAL | Age: 69
End: 2024-04-17
Payer: MEDICARE

## 2024-04-17 ENCOUNTER — HOSPITAL ENCOUNTER (OUTPATIENT)
Facility: HOSPITAL | Age: 69
Setting detail: OBSERVATION
Discharge: HOME | End: 2024-04-18
Attending: UROLOGY | Admitting: UROLOGY
Payer: MEDICARE

## 2024-04-17 ENCOUNTER — ANESTHESIA EVENT (OUTPATIENT)
Facility: HOSPITAL | Age: 69
End: 2024-04-17
Payer: MEDICARE

## 2024-04-17 DIAGNOSIS — N13.8 BPH WITH OBSTRUCTION/LOWER URINARY TRACT SYMPTOMS: Primary | ICD-10-CM

## 2024-04-17 DIAGNOSIS — N40.1 BPH WITH OBSTRUCTION/LOWER URINARY TRACT SYMPTOMS: Primary | ICD-10-CM

## 2024-04-17 PROCEDURE — 88305 TISSUE EXAM BY PATHOLOGIST: CPT

## 2024-04-17 PROCEDURE — 2500000003 HC RX 250 WO HCPCS: Performed by: NURSE ANESTHETIST, CERTIFIED REGISTERED

## 2024-04-17 PROCEDURE — 6360000002 HC RX W HCPCS: Performed by: NURSE ANESTHETIST, CERTIFIED REGISTERED

## 2024-04-17 PROCEDURE — 6360000002 HC RX W HCPCS: Performed by: UROLOGY

## 2024-04-17 PROCEDURE — 2580000003 HC RX 258: Performed by: UROLOGY

## 2024-04-17 PROCEDURE — 2700000000 HC OXYGEN THERAPY PER DAY

## 2024-04-17 PROCEDURE — 2709999900 HC NON-CHARGEABLE SUPPLY: Performed by: UROLOGY

## 2024-04-17 PROCEDURE — 7100000001 HC PACU RECOVERY - ADDTL 15 MIN: Performed by: UROLOGY

## 2024-04-17 PROCEDURE — 3700000001 HC ADD 15 MINUTES (ANESTHESIA): Performed by: UROLOGY

## 2024-04-17 PROCEDURE — 7100000000 HC PACU RECOVERY - FIRST 15 MIN: Performed by: UROLOGY

## 2024-04-17 PROCEDURE — 3600000002 HC SURGERY LEVEL 2 BASE: Performed by: UROLOGY

## 2024-04-17 PROCEDURE — 3600000012 HC SURGERY LEVEL 2 ADDTL 15MIN: Performed by: UROLOGY

## 2024-04-17 PROCEDURE — G0378 HOSPITAL OBSERVATION PER HR: HCPCS

## 2024-04-17 PROCEDURE — 2500000003 HC RX 250 WO HCPCS: Performed by: UROLOGY

## 2024-04-17 PROCEDURE — 3700000000 HC ANESTHESIA ATTENDED CARE: Performed by: UROLOGY

## 2024-04-17 PROCEDURE — 6370000000 HC RX 637 (ALT 250 FOR IP): Performed by: UROLOGY

## 2024-04-17 PROCEDURE — C2596 PROBE, ROBOTIC, WATER-JET: HCPCS | Performed by: UROLOGY

## 2024-04-17 RX ORDER — TRAMADOL HYDROCHLORIDE 50 MG/1
50 TABLET ORAL EVERY 6 HOURS PRN
Status: DISCONTINUED | OUTPATIENT
Start: 2024-04-17 | End: 2024-04-18 | Stop reason: HOSPADM

## 2024-04-17 RX ORDER — ATORVASTATIN CALCIUM 20 MG/1
40 TABLET, FILM COATED ORAL NIGHTLY
Status: DISCONTINUED | OUTPATIENT
Start: 2024-04-17 | End: 2024-04-18 | Stop reason: HOSPADM

## 2024-04-17 RX ORDER — SODIUM CHLORIDE, SODIUM LACTATE, POTASSIUM CHLORIDE, CALCIUM CHLORIDE 600; 310; 30; 20 MG/100ML; MG/100ML; MG/100ML; MG/100ML
INJECTION, SOLUTION INTRAVENOUS CONTINUOUS
Status: DISCONTINUED | OUTPATIENT
Start: 2024-04-17 | End: 2024-04-17 | Stop reason: HOSPADM

## 2024-04-17 RX ORDER — LEVOFLOXACIN 5 MG/ML
500 INJECTION, SOLUTION INTRAVENOUS EVERY 24 HOURS
Status: COMPLETED | OUTPATIENT
Start: 2024-04-17 | End: 2024-04-17

## 2024-04-17 RX ORDER — SODIUM CHLORIDE, SODIUM LACTATE, POTASSIUM CHLORIDE, CALCIUM CHLORIDE 600; 310; 30; 20 MG/100ML; MG/100ML; MG/100ML; MG/100ML
INJECTION, SOLUTION INTRAVENOUS CONTINUOUS
Status: DISCONTINUED | OUTPATIENT
Start: 2024-04-17 | End: 2024-04-18 | Stop reason: HOSPADM

## 2024-04-17 RX ORDER — ONDANSETRON 2 MG/ML
4 INJECTION INTRAMUSCULAR; INTRAVENOUS
Status: DISCONTINUED | OUTPATIENT
Start: 2024-04-17 | End: 2024-04-17 | Stop reason: HOSPADM

## 2024-04-17 RX ORDER — ONDANSETRON 2 MG/ML
4 INJECTION INTRAMUSCULAR; INTRAVENOUS EVERY 6 HOURS PRN
Status: DISCONTINUED | OUTPATIENT
Start: 2024-04-17 | End: 2024-04-18 | Stop reason: HOSPADM

## 2024-04-17 RX ORDER — SODIUM CHLORIDE 0.9 % (FLUSH) 0.9 %
5-40 SYRINGE (ML) INJECTION EVERY 12 HOURS SCHEDULED
Status: DISCONTINUED | OUTPATIENT
Start: 2024-04-17 | End: 2024-04-17 | Stop reason: HOSPADM

## 2024-04-17 RX ORDER — FENTANYL CITRATE 50 UG/ML
INJECTION, SOLUTION INTRAMUSCULAR; INTRAVENOUS PRN
Status: DISCONTINUED | OUTPATIENT
Start: 2024-04-17 | End: 2024-04-17 | Stop reason: SDUPTHER

## 2024-04-17 RX ORDER — DEXMEDETOMIDINE HYDROCHLORIDE 100 UG/ML
INJECTION, SOLUTION INTRAVENOUS PRN
Status: DISCONTINUED | OUTPATIENT
Start: 2024-04-17 | End: 2024-04-17 | Stop reason: SDUPTHER

## 2024-04-17 RX ORDER — SODIUM CHLORIDE 0.9 % (FLUSH) 0.9 %
5-40 SYRINGE (ML) INJECTION EVERY 12 HOURS SCHEDULED
Status: DISCONTINUED | OUTPATIENT
Start: 2024-04-17 | End: 2024-04-18 | Stop reason: HOSPADM

## 2024-04-17 RX ORDER — OXYCODONE HYDROCHLORIDE 5 MG/1
5 TABLET ORAL PRN
Status: DISCONTINUED | OUTPATIENT
Start: 2024-04-17 | End: 2024-04-17 | Stop reason: HOSPADM

## 2024-04-17 RX ORDER — MIDAZOLAM HYDROCHLORIDE 1 MG/ML
INJECTION INTRAMUSCULAR; INTRAVENOUS PRN
Status: DISCONTINUED | OUTPATIENT
Start: 2024-04-17 | End: 2024-04-17 | Stop reason: SDUPTHER

## 2024-04-17 RX ORDER — DIPHENHYDRAMINE HYDROCHLORIDE 50 MG/ML
12.5 INJECTION INTRAMUSCULAR; INTRAVENOUS
Status: DISCONTINUED | OUTPATIENT
Start: 2024-04-17 | End: 2024-04-17 | Stop reason: HOSPADM

## 2024-04-17 RX ORDER — MIDAZOLAM HYDROCHLORIDE 2 MG/2ML
2 INJECTION, SOLUTION INTRAMUSCULAR; INTRAVENOUS
Status: DISCONTINUED | OUTPATIENT
Start: 2024-04-17 | End: 2024-04-17 | Stop reason: HOSPADM

## 2024-04-17 RX ORDER — ROCURONIUM BROMIDE 10 MG/ML
INJECTION, SOLUTION INTRAVENOUS PRN
Status: DISCONTINUED | OUTPATIENT
Start: 2024-04-17 | End: 2024-04-17 | Stop reason: SDUPTHER

## 2024-04-17 RX ORDER — AMLODIPINE BESYLATE 5 MG/1
10 TABLET ORAL DAILY
Status: DISCONTINUED | OUTPATIENT
Start: 2024-04-18 | End: 2024-04-18 | Stop reason: HOSPADM

## 2024-04-17 RX ORDER — SODIUM CHLORIDE 9 MG/ML
INJECTION, SOLUTION INTRAVENOUS CONTINUOUS
Status: DISCONTINUED | OUTPATIENT
Start: 2024-04-17 | End: 2024-04-18 | Stop reason: HOSPADM

## 2024-04-17 RX ORDER — NALOXONE HYDROCHLORIDE 0.4 MG/ML
INJECTION, SOLUTION INTRAMUSCULAR; INTRAVENOUS; SUBCUTANEOUS PRN
Status: DISCONTINUED | OUTPATIENT
Start: 2024-04-17 | End: 2024-04-17 | Stop reason: HOSPADM

## 2024-04-17 RX ORDER — PHENAZOPYRIDINE HYDROCHLORIDE 100 MG/1
100 TABLET, FILM COATED ORAL
Status: DISCONTINUED | OUTPATIENT
Start: 2024-04-17 | End: 2024-04-18 | Stop reason: HOSPADM

## 2024-04-17 RX ORDER — ACETAMINOPHEN 500 MG
1000 TABLET ORAL EVERY 6 HOURS SCHEDULED
Status: DISCONTINUED | OUTPATIENT
Start: 2024-04-17 | End: 2024-04-18 | Stop reason: HOSPADM

## 2024-04-17 RX ORDER — ONDANSETRON 2 MG/ML
INJECTION INTRAMUSCULAR; INTRAVENOUS PRN
Status: DISCONTINUED | OUTPATIENT
Start: 2024-04-17 | End: 2024-04-17 | Stop reason: SDUPTHER

## 2024-04-17 RX ORDER — POTASSIUM CHLORIDE 20 MEQ/1
40 TABLET, EXTENDED RELEASE ORAL PRN
Status: DISCONTINUED | OUTPATIENT
Start: 2024-04-17 | End: 2024-04-18 | Stop reason: HOSPADM

## 2024-04-17 RX ORDER — DEXAMETHASONE SODIUM PHOSPHATE 4 MG/ML
INJECTION, SOLUTION INTRA-ARTICULAR; INTRALESIONAL; INTRAMUSCULAR; INTRAVENOUS; SOFT TISSUE PRN
Status: DISCONTINUED | OUTPATIENT
Start: 2024-04-17 | End: 2024-04-17 | Stop reason: SDUPTHER

## 2024-04-17 RX ORDER — ONDANSETRON 4 MG/1
4 TABLET, ORALLY DISINTEGRATING ORAL EVERY 8 HOURS PRN
Status: DISCONTINUED | OUTPATIENT
Start: 2024-04-17 | End: 2024-04-18 | Stop reason: HOSPADM

## 2024-04-17 RX ORDER — HYDROMORPHONE HYDROCHLORIDE 1 MG/ML
0.5 INJECTION, SOLUTION INTRAMUSCULAR; INTRAVENOUS; SUBCUTANEOUS EVERY 5 MIN PRN
Status: DISCONTINUED | OUTPATIENT
Start: 2024-04-17 | End: 2024-04-17 | Stop reason: HOSPADM

## 2024-04-17 RX ORDER — GENTAMICIN SULFATE 100 MG/100ML
100 INJECTION, SOLUTION INTRAVENOUS ONCE
Status: DISCONTINUED | OUTPATIENT
Start: 2024-04-17 | End: 2024-04-17

## 2024-04-17 RX ORDER — SODIUM CHLORIDE 9 MG/ML
INJECTION, SOLUTION INTRAVENOUS PRN
Status: DISCONTINUED | OUTPATIENT
Start: 2024-04-17 | End: 2024-04-17 | Stop reason: HOSPADM

## 2024-04-17 RX ORDER — SODIUM CHLORIDE 9 MG/ML
INJECTION, SOLUTION INTRAVENOUS PRN
Status: DISCONTINUED | OUTPATIENT
Start: 2024-04-17 | End: 2024-04-18 | Stop reason: HOSPADM

## 2024-04-17 RX ORDER — LIDOCAINE HYDROCHLORIDE 20 MG/ML
INJECTION, SOLUTION EPIDURAL; INFILTRATION; INTRACAUDAL; PERINEURAL PRN
Status: DISCONTINUED | OUTPATIENT
Start: 2024-04-17 | End: 2024-04-17 | Stop reason: SDUPTHER

## 2024-04-17 RX ORDER — DROPERIDOL 2.5 MG/ML
0.62 INJECTION, SOLUTION INTRAMUSCULAR; INTRAVENOUS
Status: DISCONTINUED | OUTPATIENT
Start: 2024-04-17 | End: 2024-04-17 | Stop reason: HOSPADM

## 2024-04-17 RX ORDER — SENNA AND DOCUSATE SODIUM 50; 8.6 MG/1; MG/1
1 TABLET, FILM COATED ORAL 2 TIMES DAILY
Status: DISCONTINUED | OUTPATIENT
Start: 2024-04-17 | End: 2024-04-18 | Stop reason: HOSPADM

## 2024-04-17 RX ORDER — POTASSIUM CHLORIDE 7.45 MG/ML
10 INJECTION INTRAVENOUS PRN
Status: DISCONTINUED | OUTPATIENT
Start: 2024-04-17 | End: 2024-04-18 | Stop reason: HOSPADM

## 2024-04-17 RX ORDER — FENTANYL CITRATE 50 UG/ML
50 INJECTION, SOLUTION INTRAMUSCULAR; INTRAVENOUS EVERY 5 MIN PRN
Status: DISCONTINUED | OUTPATIENT
Start: 2024-04-17 | End: 2024-04-17 | Stop reason: HOSPADM

## 2024-04-17 RX ORDER — OXYCODONE HYDROCHLORIDE 5 MG/1
10 TABLET ORAL PRN
Status: DISCONTINUED | OUTPATIENT
Start: 2024-04-17 | End: 2024-04-17 | Stop reason: HOSPADM

## 2024-04-17 RX ORDER — GLYCOPYRROLATE 0.2 MG/ML
INJECTION INTRAMUSCULAR; INTRAVENOUS PRN
Status: DISCONTINUED | OUTPATIENT
Start: 2024-04-17 | End: 2024-04-17 | Stop reason: SDUPTHER

## 2024-04-17 RX ORDER — PROPOFOL 10 MG/ML
INJECTION, EMULSION INTRAVENOUS PRN
Status: DISCONTINUED | OUTPATIENT
Start: 2024-04-17 | End: 2024-04-17 | Stop reason: SDUPTHER

## 2024-04-17 RX ORDER — AMINOCAPROIC ACID 500 MG/1
500 TABLET ORAL EVERY 6 HOURS SCHEDULED
Status: COMPLETED | OUTPATIENT
Start: 2024-04-17 | End: 2024-04-18

## 2024-04-17 RX ORDER — SODIUM CHLORIDE 0.9 % (FLUSH) 0.9 %
5-40 SYRINGE (ML) INJECTION PRN
Status: DISCONTINUED | OUTPATIENT
Start: 2024-04-17 | End: 2024-04-18 | Stop reason: HOSPADM

## 2024-04-17 RX ORDER — MAGNESIUM SULFATE IN WATER 40 MG/ML
2000 INJECTION, SOLUTION INTRAVENOUS PRN
Status: DISCONTINUED | OUTPATIENT
Start: 2024-04-17 | End: 2024-04-18 | Stop reason: HOSPADM

## 2024-04-17 RX ORDER — SODIUM CHLORIDE 0.9 % (FLUSH) 0.9 %
5-40 SYRINGE (ML) INJECTION PRN
Status: DISCONTINUED | OUTPATIENT
Start: 2024-04-17 | End: 2024-04-17 | Stop reason: HOSPADM

## 2024-04-17 RX ORDER — LABETALOL HYDROCHLORIDE 5 MG/ML
10 INJECTION, SOLUTION INTRAVENOUS
Status: DISCONTINUED | OUTPATIENT
Start: 2024-04-17 | End: 2024-04-17 | Stop reason: HOSPADM

## 2024-04-17 RX ADMIN — AMINOCAPROIC ACID 5000 MG: 250 INJECTION, SOLUTION INTRAVENOUS at 11:32

## 2024-04-17 RX ADMIN — PROPOFOL 200 MG: 10 INJECTION, EMULSION INTRAVENOUS at 09:12

## 2024-04-17 RX ADMIN — SENNOSIDES AND DOCUSATE SODIUM 1 TABLET: 8.6; 5 TABLET ORAL at 12:39

## 2024-04-17 RX ADMIN — SUGAMMADEX 200 MG: 100 INJECTION, SOLUTION INTRAVENOUS at 10:03

## 2024-04-17 RX ADMIN — TRAMADOL HYDROCHLORIDE 50 MG: 50 TABLET, COATED ORAL at 20:01

## 2024-04-17 RX ADMIN — SODIUM CHLORIDE, SODIUM LACTATE, POTASSIUM CHLORIDE, AND CALCIUM CHLORIDE: 600; 310; 30; 20 INJECTION, SOLUTION INTRAVENOUS at 07:37

## 2024-04-17 RX ADMIN — PHENAZOPYRIDINE 100 MG: 100 TABLET ORAL at 17:42

## 2024-04-17 RX ADMIN — ACETAMINOPHEN 1000 MG: 500 TABLET ORAL at 17:42

## 2024-04-17 RX ADMIN — FENTANYL CITRATE 100 MCG: 50 INJECTION, SOLUTION INTRAMUSCULAR; INTRAVENOUS at 09:06

## 2024-04-17 RX ADMIN — SODIUM CHLORIDE: 9 INJECTION, SOLUTION INTRAVENOUS at 23:14

## 2024-04-17 RX ADMIN — ACETAMINOPHEN 1000 MG: 500 TABLET ORAL at 12:38

## 2024-04-17 RX ADMIN — ROCURONIUM BROMIDE 15 MG: 10 INJECTION, SOLUTION INTRAVENOUS at 09:21

## 2024-04-17 RX ADMIN — DEXMEDETOMIDINE HYDROCHLORIDE 6 MCG: 100 INJECTION, SOLUTION INTRAVENOUS at 09:27

## 2024-04-17 RX ADMIN — TRAMADOL HYDROCHLORIDE 50 MG: 50 TABLET, COATED ORAL at 12:38

## 2024-04-17 RX ADMIN — FENTANYL CITRATE 25 MCG: 50 INJECTION, SOLUTION INTRAMUSCULAR; INTRAVENOUS at 09:30

## 2024-04-17 RX ADMIN — PHENAZOPYRIDINE 100 MG: 100 TABLET ORAL at 13:29

## 2024-04-17 RX ADMIN — FENTANYL CITRATE 25 MCG: 50 INJECTION, SOLUTION INTRAMUSCULAR; INTRAVENOUS at 09:32

## 2024-04-17 RX ADMIN — AMINOCAPROIC ACID 500 MG: 500 TABLET ORAL at 13:29

## 2024-04-17 RX ADMIN — FENTANYL CITRATE 25 MCG: 50 INJECTION, SOLUTION INTRAMUSCULAR; INTRAVENOUS at 09:42

## 2024-04-17 RX ADMIN — ATORVASTATIN CALCIUM 40 MG: 20 TABLET, FILM COATED ORAL at 20:01

## 2024-04-17 RX ADMIN — DEXAMETHASONE SODIUM PHOSPHATE 4 MG: 4 INJECTION, SOLUTION INTRAMUSCULAR; INTRAVENOUS at 09:19

## 2024-04-17 RX ADMIN — ROCURONIUM BROMIDE 25 MG: 10 INJECTION, SOLUTION INTRAVENOUS at 09:19

## 2024-04-17 RX ADMIN — WATER 3000 MG: 1 INJECTION INTRAMUSCULAR; INTRAVENOUS; SUBCUTANEOUS at 09:08

## 2024-04-17 RX ADMIN — SODIUM CHLORIDE: 9 INJECTION, SOLUTION INTRAVENOUS at 13:30

## 2024-04-17 RX ADMIN — LEVOFLOXACIN 500 MG: 5 INJECTION, SOLUTION INTRAVENOUS at 12:39

## 2024-04-17 RX ADMIN — GLYCOPYRROLATE 0.2 MG: 0.2 INJECTION INTRAMUSCULAR; INTRAVENOUS at 09:06

## 2024-04-17 RX ADMIN — ONDANSETRON HYDROCHLORIDE 4 MG: 2 INJECTION INTRAMUSCULAR; INTRAVENOUS at 09:19

## 2024-04-17 RX ADMIN — SODIUM CHLORIDE, SODIUM LACTATE, POTASSIUM CHLORIDE, AND CALCIUM CHLORIDE: 600; 310; 30; 20 INJECTION, SOLUTION INTRAVENOUS at 12:39

## 2024-04-17 RX ADMIN — SODIUM CHLORIDE, SODIUM LACTATE, POTASSIUM CHLORIDE, AND CALCIUM CHLORIDE: 600; 310; 30; 20 INJECTION, SOLUTION INTRAVENOUS at 10:04

## 2024-04-17 RX ADMIN — DEXMEDETOMIDINE HYDROCHLORIDE 4 MCG: 100 INJECTION, SOLUTION INTRAVENOUS at 09:44

## 2024-04-17 RX ADMIN — MIDAZOLAM 2 MG: 1 INJECTION INTRAMUSCULAR; INTRAVENOUS at 09:06

## 2024-04-17 RX ADMIN — AMINOCAPROIC ACID 500 MG: 500 TABLET ORAL at 17:42

## 2024-04-17 RX ADMIN — LIDOCAINE HYDROCHLORIDE 100 MG: 20 INJECTION, SOLUTION EPIDURAL; INFILTRATION; INTRACAUDAL; PERINEURAL at 09:12

## 2024-04-17 RX ADMIN — ROCURONIUM BROMIDE 10 MG: 10 INJECTION, SOLUTION INTRAVENOUS at 09:27

## 2024-04-17 RX ADMIN — SENNOSIDES AND DOCUSATE SODIUM 1 TABLET: 8.6; 5 TABLET ORAL at 20:01

## 2024-04-17 ASSESSMENT — PAIN DESCRIPTION - DESCRIPTORS
DESCRIPTORS: DISCOMFORT
DESCRIPTORS: ACHING;SORE
DESCRIPTORS: DISCOMFORT

## 2024-04-17 ASSESSMENT — PAIN SCALES - GENERAL
PAINLEVEL_OUTOF10: 0
PAINLEVEL_OUTOF10: 4
PAINLEVEL_OUTOF10: 4
PAINLEVEL_OUTOF10: 5
PAINLEVEL_OUTOF10: 0
PAINLEVEL_OUTOF10: 0
PAINLEVEL_OUTOF10: 3
PAINLEVEL_OUTOF10: 7

## 2024-04-17 ASSESSMENT — PAIN - FUNCTIONAL ASSESSMENT
PAIN_FUNCTIONAL_ASSESSMENT: ACTIVITIES ARE NOT PREVENTED
PAIN_FUNCTIONAL_ASSESSMENT: ACTIVITIES ARE NOT PREVENTED
PAIN_FUNCTIONAL_ASSESSMENT: 0-10

## 2024-04-17 ASSESSMENT — PAIN DESCRIPTION - LOCATION
LOCATION: PENIS

## 2024-04-17 ASSESSMENT — PAIN DESCRIPTION - PAIN TYPE: TYPE: SURGICAL PAIN

## 2024-04-17 ASSESSMENT — PAIN DESCRIPTION - ORIENTATION: ORIENTATION: OTHER (COMMENT)

## 2024-04-17 NOTE — PERIOP NOTE
2 [facility] made aware that SBAR is ready for review. Patient assigned room #202. Cris RN will be the nurse

## 2024-04-17 NOTE — ANESTHESIA POSTPROCEDURE EVALUATION
Department of Anesthesiology  Postprocedure Note    Patient: Jose Blanco  MRN: [id number]  YOB: 1955  Date of evaluation: 4/17/2024    Procedure Summary       Date: 04/17/24 Room / Location: [facility] MAIN CYSTO / [facility] MAIN OR    Anesthesia Start: 0906 Anesthesia Stop: 1016    Procedure: AQUABLATION OF THE PROSTATE OP 23 (Bladder) Diagnosis:       Benign localized prostatic hyperplasia with lower urinary tract symptoms (LUTS)      Feeling of incomplete bladder emptying      (Benign localized prostatic hyperplasia with lower urinary tract symptoms (LUTS) [N40.1])      (Feeling of incomplete bladder emptying [R39.14])    Surgeons: Erika Day MD Responsible Provider: Demetris Mortensen MD    Anesthesia Type: General ASA Status: 3            Anesthesia Type: General    German Phase I: German Score: 9    German Phase II:      Anesthesia Post Evaluation    Patient location during evaluation: PACU  Patient participation: complete - patient participated  Level of consciousness: awake and alert  Airway patency: patent  Nausea & Vomiting: no nausea and no vomiting  Cardiovascular status: blood pressure returned to baseline  Respiratory status: acceptable  Hydration status: euvolemic  Pain management: adequate    No notable events documented.

## 2024-04-17 NOTE — ANESTHESIA PRE PROCEDURE
Department of Anesthesiology  Preprocedure Note       Name:  Jose Blanco   Age:  68 y.o.  :  1955                                          MRN:  [id number]         Date:  2024      Surgeon: Surgeon(s):  Erika Day MD    Procedure: Procedure(s):  AQUABLATION OF THE PROSTATE OP 23    Medications prior to admission:   Prior to Admission medications    Medication Sig Start Date End Date Taking? Authorizing Provider   Risankizumab-rzaa (SKYRIZI) 180 MG/1.2ML SOCT every 3 months 23   Provider, MD Wilian   trimethoprim (TRIMPEX) 100 MG tablet Take 1 tablet by mouth daily    ProviderWilian MD   tamsulosin (FLOMAX) 0.4 MG capsule daily  Patient not taking: Reported on 2024   ProviderWilian MD   amLODIPine (NORVASC) 10 MG tablet Take 1 tablet by mouth daily    Automatic Reconciliation, Ar   aspirin 81 MG chewable tablet Take 1 tablet by mouth daily    Automatic Reconciliation, Ar   atorvastatin (LIPITOR) 40 MG tablet Take 1 tablet by mouth at bedtime    Automatic Reconciliation, Ar   cyclobenzaprine (FLEXERIL) 10 MG tablet Take 1 tablet by mouth 3 times daily as needed    Automatic Reconciliation, Ar       Current medications:    Current Facility-Administered Medications   Medication Dose Route Frequency Provider Last Rate Last Admin    lactated ringers IV soln infusion   IntraVENous Continuous Erika Day  mL/hr at 24 0750 NoRateChange at 24 0750    ceFAZolin (ANCEF) 2,000 mg in sterile water 20 mL IV syringe  2,000 mg IntraVENous On Call to OR Erika Day MD           Allergies:    Allergies   Allergen Reactions    Sulfamethoxazole-Trimethoprim      Other reaction(s): rash    Sulfa Antibiotics        Problem List:  There is no problem list on file for this patient.      Past Medical History:        Diagnosis Date    History of loop recorder     non functioning; Dr Mackenzie dismissed from future followup    Hypertension

## 2024-04-17 NOTE — H&P
are concerned Cialis is working however he is having a difficult time  maintaining erections throughout intercourse.  I recommended he get a silicone ring and he will look into this.  06/28/2021   Patient here today for follow-up.  He has been out of Jatenzo.  His T level is low.  We did send a prescription to his pharmacy on 06/17/2021.   He has not received prescription as if he had.  He will contact his insurance company and his pharmacy and let us know.  4/18/22  Patient has been holding his Jatenzo because his hematocrit has been too high.  He is going to restart on Jatenzo and continue doing therapeutic phlebotomy every 3 months will check testosterone levels along with hematocrit in 4 months.  07/27/2022  Patient was donating blood at [facility].  His most recent hematocrit 2 weeks ago was 47.4%.  At this time there is no issues and he will restart on testosterone.  I will give him new rx for juatenzo  11/22/2022  Patient here today for follow-up.  He is not using Jatenzo on a regular basis his hematocrit at 45.3% testosterone at 182. At this time he is going to hold off on testosterone replacement he is concerned about having elevated hematocrit.    03/16/2023:  Patient would like to try TriMix injections.  He is asking for we did send in order for him today.  None of the oral medications are working for him.  He also wants to restart the Jatenzo.   06/19/2023  Patient did not restart on Jatenzo.  He was concerned about having polycythemia.  Discussed other options he is going to hold off on his testosterone replacement and will send a prescription to compounding Pharmacy for TriMix.  He will follow with nurse practitioners to learn how to perform injections.      06/28/2023--patient returned with TriMix 10/30/1 concentration.  Patient teaching regarding ICI done today.  Patient has good technique.  But slight hand tremor with nervousness.    10/13/2023 patient returned reported that he use  Death  Condition  Onset Age  Cause of Death  Mother        Cancer, unknown    N    Social History  (Reviewed, updated)  Tobacco use reviewed.    Preferred language is English.      Marital Status/Family/Social Support  Marital status:     Tobacco use status: Ex-light cigarette smoker.    Smoking status: Former smoker.    Tobacco Screening  Patient has used tobacco.     Smoking Status  Type  Smoking Status  Usage Per Day  Years Used  Pack Years  Total Pack Years  Cigarette  Former smoker  4 Cigarettes          Alcohol  There is no history of alcohol use.     Caffeine  The patient does not use caffeine.    Review of Systems  System  Neg/Pos  Details  Constitutional  Negative  Chills and Fever.  ENMT  Negative  Ear infections and Sore throat.  Eyes  Negative  Blurred vision, Double vision and Eye pain.  Respiratory  Negative  Asthma, Chronic cough, Dyspnea and Wheezing.  Cardio  Negative  Chest pain.  GI  Negative  Constipation, Decreased appetite, Diarrhea, Nausea and Vomiting.    Positive  Difficulty achieving erections, Difficulty maintaining erections, Nocturia, Urgency, Urinary frequency.    Negative  Morning erections and Nocturnal erections.  Endocrine  Negative  Cold intolerance, Heat intolerance, Increased thirst and Weight loss.  Neuro  Negative  Headache and Tremors.  Psych  Negative  Anxiety and Depression.  Integumentary  Negative  Itching skin and Rash.  MS  Negative  Back pain and Joint pain.  Hema/Lymph  Negative  Easy bleeding.  Reproductive  Negative  Sexual dysfunction.      Vital Signs  Vitals:    04/17/24 0715   BP: 139/68   Pulse: 66   Resp: 16   Temp: 96.9 °F (36.1 °C)   SpO2: 97%       Physical Exam  Exam  Findings  Details  Constitutional  Normal  Well developed.  Respiratory  Normal  Inspection - Normal.  Musculoskeletal  Normal  Gait - Normal.  Neurological  Normal  Level of consciousness - Normal. Orientation - Normal. Memory - Normal.  Psychiatric  Normal  Oriented to time,

## 2024-04-17 NOTE — PROGRESS NOTES
1200  Patient AAOx4. PIV is C/D/I with fluids infusing, no s/s of infiltration or phlebitis. VSS on RA. Breath sounds clear bilaterally. Patient reaching 3000 on IS. CBI intact with urinary mondragon patent and draining. Bloody drainage noted at tip of penis. SCDs intact to BLE. No other concerns at this time. Safety measure intact, call bell within reach.     1238  Patient states pain 5/10, PRN tramadol given per mar.    1248  950mL cherry urine emptied from urinary mondragon.    1329  625mL cherry urine emptied from urinary mondragon.    1416  Patient ambulated in hallway around nurses station    1548  1325mL cherry urine emptied from urinary mondragon with 2 small clots    1827  1450mL cherry urine emptied from urinary mondragon.     1905  Change of shift report given to KENNY Hsieh.  1700mL cherry urine emptied from urinary mondragon.

## 2024-04-17 NOTE — PROGRESS NOTES
1905: Bedside report received from Cris RN (offgoing nurse) to KENNY Hsieh (oncoming nurse), bedside report included nurse Handoff Report, MAR, & labs, Pt observed resting in bed at safe position, Pt without any issues. Call light within reach, ongoing monitoring in place.    1959: Shift assessment completed, Pt stable at this moment, no s/sx of distress, A&O x4, neuro WDL, chest rise and fall equally, no SOB noted, HOB elevated, LS CTA in all lobes bilaterally A&P, IS use encouraged, HS S1S2 stable & reg, ABD is soft, non-distended & non-tender, Bs x4 normoactive, +2 palpable peripheral pulses bilaterally in all extremities, + CSM, CBI flowing w/o complications noted at this moment, mondragon is patent, intact & draining Pink/Clear drainage, traction remains in place, 2000 mL emptied from mondragon bag, 20g IV noted to Lt posterior hand is patent & intact, NS infusing @ 125 ml/hr w/o complications, transparent dressing in C/D/I, pain rated 7/10 to tip of penis, PRN requested, Pt educated on medications, ambulation & pain control, Pt verbalized understanding, Pt resting in bed at safe position & call bell within reach, ongoing monitoring in place.      2100: Pt ambulated to throughout 2 south unit and back to room w/o complications, Pt tolerated walk well    2135: 1500 mL emptied from mondragon bag, pink/clear drainage     2130: Pt stated \"I lifted my penis while walking to adjust it and the tape came off\", This nurse observed area, Traction reinforced with soft tape, drainage still noted at old gauze, old gauze noted coming off & saturated, new gauzed placed, no c/o pain to site however, Pt states that some burning is noted, Pt currently resting in bed at safe position with call bell within reach, ongoing monitoring in place.     2200: 800 mL emptied from mondragon bag, pink/clear drainage    2220: Pt c/o feelings of urgency, Pt education provided r/t mondragon/CBI, Pt had some concerns r/t small amounts of bloody drainage noted from

## 2024-04-17 NOTE — PERIOP NOTE
Reviewed PTA medication list with patient/caregiver and patient/caregiver denies any additional medications.     Patient admits to having a responsible adult care for them at home for at least 24 hours after surgery.    Patient encouraged to use gown warming system and informed that using said warming gown to regulate body temperature prior to a procedure has been shown to help reduce the risks of blood clots and infection.    Patient's pharmacy of choice verified and documented in PTA medication section.    Dual skin assessment & fall risk band verification completed with Rosmery LÓPEZ  RN.

## 2024-04-17 NOTE — OP NOTE
compress of the prostatic fossa is applied to provide better visualization and aid in deeper ablation. The positioning was then confirmed using the transverse TRUS view. After positioning was deemed adequate on the computer screen, we began planning the ablation process on the monitor by first defining the median lobe where applicable and the largest prostate cross-section to allow planning the depth and angle of ablation. Then, we defined the resection contour in a fashion which follows the adenoma dimensions and spares the bladder neck and massiel-verumontanum tissue. Once surgical planning is complete, the aquablation process was initiated using a foot pedal, causing the console pump to deliver a high-velocity saline jet orthogonally with adjustable flow rates. The ablation was performed automatically and followed the pre-defined contour. One additional pass was performed to remove any residual obstructive tissue. Selective bladder neck resection and cautery was performed additionally  to control any arterial bleeding encountered was addressed using bipolar cauterization to achieve hemostasis. After a 22fr 3 way  coude tip mondragon catheter with 40ml in balloon was placed with urine light pink and a CBI was started. He was awakened from anesthesia and then transferred to the post anesthesia care unit.      Electronically signed by Erika Day MD on 4/17/2024 at 10:18 AM

## 2024-04-17 NOTE — PERIOP NOTE
TRANSFER - OUT REPORT:    Verbal report given to KENNY Lynch on Jose Blanco  being transferred to [facility] for routine post-op       Report consisted of patient's Situation, Background, Assessment and   Recommendations(SBAR).     Information from the following report(s) Adult Overview, Surgery Report, Intake/Output, and MAR was reviewed with the receiving nurse.           Lines:   Peripheral IV 04/17/24 Left;Posterior Hand (Active)   Site Assessment Clean, dry & intact 04/17/24 1015   Line Status Infusing 04/17/24 1015   Line Care Connections checked and tightened 04/17/24 1015   Phlebitis Assessment No symptoms 04/17/24 1015   Infiltration Assessment 0 04/17/24 1015   Alcohol Cap Used No 04/17/24 1015   Dressing Status Clean, dry & intact 04/17/24 1015   Dressing Type Transparent 04/17/24 1015        Opportunity for questions and clarification was provided.      Patient transported with:  Registered Nurse

## 2024-04-18 VITALS
BODY MASS INDEX: 35.36 KG/M2 | OXYGEN SATURATION: 91 % | RESPIRATION RATE: 14 BRPM | TEMPERATURE: 98.6 F | SYSTOLIC BLOOD PRESSURE: 144 MMHG | DIASTOLIC BLOOD PRESSURE: 76 MMHG | WEIGHT: 275.5 LBS | HEIGHT: 74 IN | HEART RATE: 70 BPM

## 2024-04-18 PROCEDURE — 6370000000 HC RX 637 (ALT 250 FOR IP): Performed by: UROLOGY

## 2024-04-18 PROCEDURE — G0378 HOSPITAL OBSERVATION PER HR: HCPCS

## 2024-04-18 PROCEDURE — 2580000003 HC RX 258: Performed by: UROLOGY

## 2024-04-18 RX ORDER — DOCUSATE SODIUM 100 MG/1
100 CAPSULE, LIQUID FILLED ORAL 2 TIMES DAILY
Qty: 20 CAPSULE | Refills: 0 | Status: SHIPPED | OUTPATIENT
Start: 2024-04-18 | End: 2024-04-28

## 2024-04-18 RX ORDER — TRAMADOL HYDROCHLORIDE 50 MG/1
50 TABLET ORAL EVERY 6 HOURS PRN
Qty: 15 TABLET | Refills: 0 | Status: SHIPPED | OUTPATIENT
Start: 2024-04-18 | End: 2024-04-25

## 2024-04-18 RX ORDER — PHENAZOPYRIDINE HYDROCHLORIDE 200 MG/1
200 TABLET, FILM COATED ORAL
Qty: 15 TABLET | Refills: 0 | Status: SHIPPED | OUTPATIENT
Start: 2024-04-18 | End: 2024-04-23

## 2024-04-18 RX ORDER — LEVOFLOXACIN 500 MG/1
500 TABLET, FILM COATED ORAL DAILY
Qty: 3 TABLET | Refills: 0 | Status: SHIPPED | OUTPATIENT
Start: 2024-04-18 | End: 2024-04-21

## 2024-04-18 RX ADMIN — AMINOCAPROIC ACID 500 MG: 500 TABLET ORAL at 00:18

## 2024-04-18 RX ADMIN — SODIUM CHLORIDE: 9 INJECTION, SOLUTION INTRAVENOUS at 06:15

## 2024-04-18 RX ADMIN — AMINOCAPROIC ACID 500 MG: 500 TABLET ORAL at 06:15

## 2024-04-18 RX ADMIN — SENNOSIDES AND DOCUSATE SODIUM 1 TABLET: 8.6; 5 TABLET ORAL at 09:18

## 2024-04-18 RX ADMIN — PHENAZOPYRIDINE 100 MG: 100 TABLET ORAL at 09:18

## 2024-04-18 RX ADMIN — ACETAMINOPHEN 1000 MG: 500 TABLET ORAL at 00:18

## 2024-04-18 RX ADMIN — TRAMADOL HYDROCHLORIDE 50 MG: 50 TABLET, COATED ORAL at 01:32

## 2024-04-18 RX ADMIN — AMLODIPINE BESYLATE 10 MG: 5 TABLET ORAL at 09:18

## 2024-04-18 RX ADMIN — ACETAMINOPHEN 1000 MG: 500 TABLET ORAL at 06:15

## 2024-04-18 ASSESSMENT — PAIN DESCRIPTION - DESCRIPTORS: DESCRIPTORS: SORE

## 2024-04-18 ASSESSMENT — PAIN DESCRIPTION - ORIENTATION: ORIENTATION: OTHER (COMMENT)

## 2024-04-18 ASSESSMENT — PAIN SCALES - GENERAL: PAINLEVEL_OUTOF10: 5

## 2024-04-18 ASSESSMENT — PAIN DESCRIPTION - LOCATION: LOCATION: PENIS

## 2024-04-18 ASSESSMENT — PAIN DESCRIPTION - PAIN TYPE: TYPE: SURGICAL PAIN

## 2024-04-18 NOTE — PROGRESS NOTES
SW met with patient at bedside regarding discharge planning. Patient confirmed he had ambulated. Patient denied the use of any DMEs or requiring any during recovery; and upon discharge that his wife will transport him and support him during recovery at home. Patient confirmed he has the means to get his discharge medication. Patient does not have any additional CM needs.       MARVEL Carballo  Supervisee in Social Work  Care Management Department

## 2024-04-18 NOTE — DISCHARGE SUMMARY
Physician Discharge Summary     Patient ID:  Jose Blanco  [id number]  68 y.o.  1955    Admit date: 4/17/2024    Discharge date and time: 4/18/2024    Admitting Physician: Erika Day MD     Discharge Physician: Erika Day MD      Admission Diagnoses: Benign localized prostatic hyperplasia with lower urinary tract symptoms (LUTS) [N40.1]  Feeling of incomplete bladder emptying [R39.14]  BPH with obstruction/lower urinary tract symptoms [N40.1, N13.8]    Discharge Diagnoses: BPH with LUTS    Admission Condition: good    Discharged Condition: good    Indication for Admission: BPH    Hospital Course: Unremarkable.     Consults: none    Significant Diagnostic Studies: None    Treatments: surgery: Aqua Ablation of prostate    Discharge Exam:  Nad, breathing well  Urine clear    Disposition: home    In process/preliminary results:  Outstanding Order Results       No orders found for last 30 day(s).            Patient Instructions:   Current Discharge Medication List        START taking these medications    Details   traMADol (ULTRAM) 50 MG tablet Take 1 tablet by mouth every 6 hours as needed for Pain for up to 7 days. Max Daily Amount: 200 mg  Qty: 15 tablet, Refills: 0    Comments: Reduce doses taken as pain becomes manageable  Associated Diagnoses: BPH with obstruction/lower urinary tract symptoms      docusate sodium (COLACE) 100 MG capsule Take 1 capsule by mouth 2 times daily for 10 days  Qty: 20 capsule, Refills: 0      levoFLOXacin (LEVAQUIN) 500 MG tablet Take 1 tablet by mouth daily for 3 days  Qty: 3 tablet, Refills: 0      phenazopyridine (PYRIDIUM) 200 MG tablet Take 1 tablet by mouth 3 times daily (with meals) for 5 days  Qty: 15 tablet, Refills: 0           CONTINUE these medications which have NOT CHANGED    Details   Risankizumab-rzaa (SKYRIZI) 180 MG/1.2ML SOCT every 3 months      trimethoprim (TRIMPEX) 100 MG tablet Take 1 tablet by mouth daily      amLODIPine (NORVASC) 10 MG

## 2024-04-18 NOTE — PROGRESS NOTES
0714  Change of shift report received from KENNY Hsieh. Per KENNY Hsieh MD wants patient to be bladder scanned after voiding and paged with findings.    0845  Patient AAOx4. PIV is C/D/I with fluids infusing, no s/s of infiltration or phlebitis. VSS on RA. Breath sounds clear bilaterally. Patient reaching 3000 on IS. Patient has no c/o pain at this time. No urethral drainage noted. SCDs intact to BLE. No other concerns at this time. Safety measure intact, call bell within reach.     0852  Patient voided 750mL orange/pink urine.  Bladder scanner detected 136mL residual urine.    0854  MD Day paged at this time. Awaiting call back.    0918  MD Day returned page and made aware patient had two voids totaling 750mL orange/pink urine output, and bladder scanner detected 136mL residual urine. Per MD Day, patient okay to discharge home.     0946  AVS meds reviewed with KENNY Amaya.   Discharge paperwork reviewed with patient, all questions answered. No other concerns at this time.   PIV removed, catheter tip intact upon removal. Covered with gauze and tape.

## 2024-04-18 NOTE — PLAN OF CARE
Problem: Chronic Conditions and Co-morbidities  Goal: Patient's chronic conditions and co-morbidity symptoms are monitored and maintained or improved  Outcome: Progressing     Problem: Safety - Adult  Goal: Free from fall injury  4/17/2024 2247 by Мария Gomez RN  Outcome: Progressing  4/17/2024 1432 by Cris Monae RN  Outcome: Progressing     Problem: Pain  Goal: Verbalizes/displays adequate comfort level or baseline comfort level  4/17/2024 2247 by Мария Gomez RN  Outcome: Progressing  4/17/2024 1432 by Cris Monae RN  Outcome: Progressing     Problem: Discharge Planning  Goal: Discharge to home or other facility with appropriate resources  Outcome: Progressing     Problem: Skin/Tissue Integrity  Goal: Absence of new skin breakdown  Description: 1.  Monitor for areas of redness and/or skin breakdown  2.  Assess vascular access sites hourly  3.  Every 4-6 hours minimum:  Change oxygen saturation probe site  4.  Every 4-6 hours:  If on nasal continuous positive airway pressure, respiratory therapy assess nares and determine need for appliance change or resting period.  4/17/2024 2247 by Мария Gomez RN  Outcome: Progressing  4/17/2024 1432 by Cris Monae RN  Outcome: Progressing     Problem: ABCDS Injury Assessment  Goal: Absence of physical injury  Outcome: Progressing

## 2024-04-18 NOTE — DISCHARGE INSTRUCTIONS
[facility], Urology     [facility], [address], [address], [place] [address]  Office: [phone]  Fax:    [phone]    PROCEDURE   Aqua Ablation of prostate  __  Notify [facility] IMMEDIATELY if any of the following occur:    You are unable to urinate.  Urgency to urinate is not uncommon.   You find yourself urinating small frequent amounts associated with severe lower abdominal discomfort.   Bright red blood with clots in the urine. Some reddish urine is not uncommon and should be treated with increasing the amount of fluids you drink.   Temperature above 101.5° and / or chills.   You are nauseous and / or vomiting and you cannot hold down any fluids.   Your pain is not controlled with the pain medication prescribed.    Special Considerations:     Do not drive for at least 24-48 hours after the procedure and until you are no longer taking narcotic pain medication and you are able to move and react without hesitation.    _x_  No heavy lifting over 10 pounds & no strenuous exercise for 1  week   __x  Other instructions:  Drink about 2 liters of water a day. Normal to see some blood in urine and have urinary urgency and freq and burning, which may last on and off for 6 weeks.   _x_  Our office will call you to make an appointment.    Please contact [facility] at [phone] or go to the nearest Emergency Department / Urgent Care facility for any other medical questions or concerns.

## (undated) DEVICE — CYSTO PACK: Brand: MEDLINE INDUSTRIES, INC.

## (undated) DEVICE — LINER,SEMI-RIGID,3000CC,50EA/CS: Brand: MEDLINE

## (undated) DEVICE — PACK DRAPE AQUA ABLATION

## (undated) DEVICE — BAG  LEG  EX-TUBING  18IN  MEDIUM  20OZ

## (undated) DEVICE — TUBING, SUCTION, 1/4" X 12', STRAIGHT: Brand: MEDLINE

## (undated) DEVICE — SYRINGE,TOOMEY,IRRIGATION,70CC,STERILE: Brand: MEDLINE

## (undated) DEVICE — CUTTING LOOP, BIPOLAR, 24/26 FR.: Brand: N.A.

## (undated) DEVICE — DEVICE SECUREMENT 1/32IN POLYETH FOAM F ANCHR URIN CATH

## (undated) DEVICE — GEL,ULTRASOUND,BOTTLE,8.5-OZ,CLEAR: Brand: MEDLINE INDUSTRIES, INC.

## (undated) DEVICE — SYRINGE CATH TIP 50ML

## (undated) DEVICE — Device: Brand: AQUABEAM HANDPIECE

## (undated) DEVICE — PAD,NON-ADHERENT,3X8,STERILE,LF,1/PK: Brand: MEDLINE

## (undated) DEVICE — SYRINGE MED 30ML STD CLR PLAS LUERLOCK TIP N CTRL DISP

## (undated) DEVICE — Device

## (undated) DEVICE — STERILE POLYISOPRENE POWDER-FREE SURGICAL GLOVES: Brand: PROTEXIS

## (undated) DEVICE — MAYO STAND COVER: Brand: CONVERTORS

## (undated) DEVICE — PLUG CATH TAPR GRP HNDL CAP

## (undated) DEVICE — SOLUTION IRRIG 3000ML 0.9% SOD CHL FLX CONT 0797208] ICU MEDICAL INC]

## (undated) DEVICE — DRAINBAG,ANTI-REFLUX TOWER,L/F,2000ML,LL: Brand: MEDLINE